# Patient Record
Sex: MALE | Race: NATIVE HAWAIIAN OR OTHER PACIFIC ISLANDER | NOT HISPANIC OR LATINO | URBAN - METROPOLITAN AREA
[De-identification: names, ages, dates, MRNs, and addresses within clinical notes are randomized per-mention and may not be internally consistent; named-entity substitution may affect disease eponyms.]

---

## 2022-08-28 ENCOUNTER — HOSPITAL ENCOUNTER (OUTPATIENT)
Dept: RADIOLOGY | Facility: MEDICAL CENTER | Age: 32
End: 2022-08-28
Attending: PHYSICIAN ASSISTANT
Payer: COMMERCIAL

## 2022-08-28 ENCOUNTER — OFFICE VISIT (OUTPATIENT)
Dept: URGENT CARE | Facility: PHYSICIAN GROUP | Age: 32
End: 2022-08-28
Payer: COMMERCIAL

## 2022-08-28 VITALS
HEART RATE: 81 BPM | HEIGHT: 72 IN | RESPIRATION RATE: 20 BRPM | SYSTOLIC BLOOD PRESSURE: 132 MMHG | TEMPERATURE: 96.4 F | DIASTOLIC BLOOD PRESSURE: 92 MMHG | WEIGHT: 315 LBS | OXYGEN SATURATION: 97 % | BODY MASS INDEX: 42.66 KG/M2

## 2022-08-28 DIAGNOSIS — M79.672 FOOT PAIN, LEFT: ICD-10-CM

## 2022-08-28 PROCEDURE — 99203 OFFICE O/P NEW LOW 30 MIN: CPT | Performed by: PHYSICIAN ASSISTANT

## 2022-08-28 PROCEDURE — 73630 X-RAY EXAM OF FOOT: CPT | Mod: LT

## 2022-08-28 ASSESSMENT — PAIN SCALES - GENERAL: PAINLEVEL: 2=MINIMAL-SLIGHT

## 2022-08-28 NOTE — PROGRESS NOTES
Subjective:   Irineo Torres is a 31 y.o. male who presents for Foot Injury (Pt states Left foot injury while working out at gym, yesterday, Saturday, 8/27/22)     Patient presents with chief complaint of left foot pain and swelling.  He states this started on Friday.  He denies any antecedent trauma but does report that he was doing some box jumps on Thursday.  He reports associated swelling.  He has tried some ice.  He has not tried any anti-inflammatories.  He does have some pain with ambulation.  He denies redness fever chills or myalgias.  No history of gout.        Medications:  This patient does not have an active medication from one of the medication groupers.    Allergies:             Patient has no known allergies.    Surgical History:       No past surgical history on file.    Past Social Hx:  Irineo Torres       Past Family Hx:   Irineo Torres family history is not on file.       Problem list, medications, and allergies reviewed by myself today in Epic.     Objective:     BP (!) 132/92 (BP Location: Left arm, Patient Position: Sitting, BP Cuff Size: Large adult)   Pulse 81   Temp (!) 35.8 °C (96.4 °F) (Temporal)   Resp 20   Ht 1.829 m (6')   Wt (!) 170 kg (375 lb 9.6 oz)   SpO2 97%   BMI 50.94 kg/m²     Physical Exam  Cardiovascular:      Pulses:           Dorsalis pedis pulses are 2+ on the right side and 2+ on the left side.        Posterior tibial pulses are 2+ on the right side and 2+ on the left side.   Musculoskeletal:        Feet:    Feet:      Left foot:      Skin integrity: No ulcer, blister, skin breakdown, erythema or warmth.      Comments: There is tenderness to palpation along the ATFL region.  No ecchymosis.  Some localized swelling to the area.  No pain to the distal tibia or fibula.  Mild tenderness to the base of the fifth metatarsal.  Neurovascularly intact.  Drawer negative.      RADIOLOGY RESULTS   DX-FOOT-COMPLETE 3+ LEFT    Result Date: 8/28/2022 8/28/2022 4:35 PM  HISTORY/REASON FOR EXAM:  Pain/Deformity Following Trauma; pain over lateral dorsum, swelling, atfl area Worsening pain to lateral aspect of left proximal foot x 2-3 days. ?Started after doing box jumps at the gym TECHNIQUE/EXAM DESCRIPTION AND NUMBER OF VIEWS: 3 views of the LEFT foot. COMPARISON:  None. FINDINGS: No acute fracture or dislocation. No joint osteoarthritis.     No acute osseous abnormality.         *X-rays were reviewed and interpreted independently by me. I agree with the radiologist's findings       Assessment/Plan:     Diagnosis and Associated Orders:     1. Foot pain, left  - DX-FOOT-COMPLETE 3+ LEFT; Future      Comments/MDM:    No radiographic evidence of bony abnormality.  Recommend ice compression elevation and rest.  Recommend ibuprofen/Tylenol as needed for pain or inflammation.  May try compression bandage.  Avoid repetitive activities.  Follow-up with orthopedics if symptoms do not improve or persist despite conservative measures    I personally reviewed prior external notes and test results pertinent to today's visit.  Red flags discussed as well as indications to present to the Emergency Department.  Supportive care, natural history, differential diagnoses, and indications for immediate follow-up discussed.  Patient expresses understanding and agrees to plan.  Patient denies any other questions or concerns.    Follow-up with the primary care physician for recheck, reevaluation, and consideration of further management.      Please note that this dictation was created using voice recognition software. I have made a reasonable attempt to correct obvious errors, but I expect that there are errors of grammar and possibly content that I did not discover before finalizing the note.    This note was electronically signed by Maya Villa PA-C

## 2025-02-24 ENCOUNTER — OFFICE VISIT (OUTPATIENT)
Dept: MEDICAL GROUP | Facility: IMAGING CENTER | Age: 35
End: 2025-02-24
Payer: COMMERCIAL

## 2025-02-24 VITALS
DIASTOLIC BLOOD PRESSURE: 82 MMHG | BODY MASS INDEX: 41.58 KG/M2 | SYSTOLIC BLOOD PRESSURE: 126 MMHG | OXYGEN SATURATION: 98 % | RESPIRATION RATE: 16 BRPM | HEIGHT: 72 IN | TEMPERATURE: 97 F | HEART RATE: 80 BPM | WEIGHT: 307 LBS

## 2025-02-24 DIAGNOSIS — Z11.3 SCREEN FOR STD (SEXUALLY TRANSMITTED DISEASE): ICD-10-CM

## 2025-02-24 DIAGNOSIS — Z23 NEED FOR VACCINATION: ICD-10-CM

## 2025-02-24 DIAGNOSIS — F33.42 RECURRENT MAJOR DEPRESSIVE DISORDER, IN FULL REMISSION (HCC): ICD-10-CM

## 2025-02-24 DIAGNOSIS — E66.01 MORBID OBESITY WITH BMI OF 40.0-44.9, ADULT (HCC): ICD-10-CM

## 2025-02-24 DIAGNOSIS — Z00.00 ANNUAL PHYSICAL EXAM: ICD-10-CM

## 2025-02-24 DIAGNOSIS — M25.572 ACUTE BILATERAL ANKLE PAIN: ICD-10-CM

## 2025-02-24 DIAGNOSIS — Z76.89 ENCOUNTER TO ESTABLISH CARE: ICD-10-CM

## 2025-02-24 DIAGNOSIS — M25.571 ACUTE BILATERAL ANKLE PAIN: ICD-10-CM

## 2025-02-24 PROBLEM — F32.5 MAJOR DEPRESSIVE DISORDER IN FULL REMISSION (HCC): Status: ACTIVE | Noted: 2025-02-24

## 2025-02-24 PROCEDURE — 90715 TDAP VACCINE 7 YRS/> IM: CPT | Performed by: NURSE PRACTITIONER

## 2025-02-24 PROCEDURE — 90471 IMMUNIZATION ADMIN: CPT | Performed by: NURSE PRACTITIONER

## 2025-02-24 PROCEDURE — 3079F DIAST BP 80-89 MM HG: CPT | Performed by: NURSE PRACTITIONER

## 2025-02-24 PROCEDURE — 99214 OFFICE O/P EST MOD 30 MIN: CPT | Mod: 25 | Performed by: NURSE PRACTITIONER

## 2025-02-24 PROCEDURE — 1126F AMNT PAIN NOTED NONE PRSNT: CPT | Performed by: NURSE PRACTITIONER

## 2025-02-24 PROCEDURE — 3074F SYST BP LT 130 MM HG: CPT | Performed by: NURSE PRACTITIONER

## 2025-02-24 RX ORDER — BUPROPION HYDROCHLORIDE 150 MG/1
150 TABLET ORAL EVERY MORNING
COMMUNITY

## 2025-02-24 ASSESSMENT — PAIN SCALES - GENERAL: PAINLEVEL_OUTOF10: NO PAIN

## 2025-02-24 ASSESSMENT — PATIENT HEALTH QUESTIONNAIRE - PHQ9: CLINICAL INTERPRETATION OF PHQ2 SCORE: 0

## 2025-02-24 NOTE — PROGRESS NOTES
Subjective:     History of Present Illness  The patient presents to [facility].    He has no history of chronic medical conditions, with the exception of childhood asthma, which has since resolved. He reports no cardiac issues during his youth. He has no history of cancer, hypertension, hypercholesterolemia, or hyperglycemia. His surgical history includes the insertion of two screws in his right tibia during his freshman year of high school. He has not undergone any recent blood tests, with the last one conducted over a year ago as part of a weight loss initiative. He has not been screened for HIV or hepatitis C. He is sexually active. He has a history of chickenpox. He is uncertain about his tetanus vaccination status. He uses nicotine patches and occasionally vapes but does not smoke or use illicit drugs.    He has been experiencing headaches, which he attributes to his medication, tirzepatide. He has been on tirzepatide since 11/2024, prescribed through an online health service. He has lost approximately 30 pounds since starting the medication. He is currently on a 10 mg dose, which he will continue for the next 2 months.    He is currently on bupropion extended release 150 mg for depression, prescribed through [employer], an online health service. He was previously seeing a therapist until the end of 2024 but has not consulted a psychiatrist. He has been gradually reducing his bupropion dosage and is considering discontinuing it altogether.    He has been experiencing foot pain, initially in the left foot in 06/2024, which rendered him unable to walk. Despite x-rays showing no abnormalities, he was diagnosed with plantar fasciitis. The pain, which was localized to the top of the foot, persisted for a month before resolving spontaneously. A similar episode occurred in the right foot last month, with the pain radiating from the top of the foot to the ankle. This episode lasted for 3 to 4 weeks and was less severe  than the previous one, as he was able to walk. Both episodes were accompanied by swelling and redness. He has been managing the pain with ibuprofen, which provides some relief. He suspects the cause may be gout, although he does not consume meat regularly. He is not currently experiencing any foot symptoms.    SOCIAL HISTORY  He uses nicotine patches and occasionally vapes but does not smoke or use illicit drugs.    FAMILY HISTORY  His mother has low blood pressure and depression. His great-grandmother had colon cancer. His maternal grandfather passed away from cancer, possibly lung cancer. He reports no family history of high blood pressure, high cholesterol, diabetes, thyroid problems, prostate cancer, or arthritis. His father is alive and has no medical problems. He has 4 sisters who do not have any medical problems.    MEDICATIONS  Current: Bupropion, tirzepatide, ibuprofen.    IMMUNIZATIONS  He is uncertain about his tetanus vaccination status.      Current medicines (including changes today)  Current Outpatient Medications   Medication Sig Dispense Refill    TIRZEPATIDE-WEIGHT MANAGEMENT SC Inject  under the skin.      buPROPion (WELLBUTRIN XL) 150 MG XL tablet Take 150 mg by mouth every morning.       No current facility-administered medications for this visit.     He  has a past medical history of Asthma and Depression.    ROS   No chest pain, no shortness of breath, no abdominal pain  Positive ROS as per HPI.  All other systems reviewed and are negative.     Objective:     /82 (BP Location: Right arm, Patient Position: Sitting, BP Cuff Size: Large adult)   Pulse 80   Temp 36.1 °C (97 °F) (Temporal)   Resp 16   Ht 1.829 m (6')   Wt (!) 139 kg (307 lb)   SpO2 98%  Body mass index is 41.64 kg/m².   Physical Exam    Constitutional: Alert, no distress.  Skin: Warm, dry, good turgor, no rashes in visible areas.  Eye: Equal, round and reactive, conjunctiva clear, lids normal.  ENMT: Lips without  lesions, good dentition  Respiratory: Unlabored respiratory effort  Psych: Alert and oriented x3, normal affect and mood.      Results          Assessment and Plan:   The following treatment plan was discussed    Assessment & Plan  1. Establishment of care.  Comprehensive laboratory workup will be conducted to establish baseline health status. Tetanus vaccine will be administered today. He is advised to inquire about his mother's colonoscopy results and whether she had to undergo the procedure at an earlier age due to her cyst removal. He is scheduled for a colonoscopy in 10 years.    2. Depression.  He is advised to discontinue Wellbutrin and monitor his symptoms. If he experiences worsening depression, he can resume the medication and inform the provider.    3. Foot pain.  Uric acid level will be checked to evaluate for possible gout. If he experiences another episode of foot pain, he is advised to return for further evaluation, including potential arthritis testing.    4. Weight management.  Continue tirzepatide we telemedicine     Follow-up  The patient will follow up in 2 months.        ORDERS:  1. Encounter to establish care      2. Recurrent major depressive disorder, in full remission (HCC)      3. Morbid obesity with BMI of 40.0-44.9, adult (HCC)    - Patient identified as having weight management issue.  Appropriate orders and counseling given.  - HEMOGLOBIN A1C; Future    4. Annual physical exam    - CBC WITH DIFFERENTIAL; Future  - Comp Metabolic Panel; Future  - TSH WITH REFLEX TO FT4; Future  - Lipid Profile; Future  - HEMOGLOBIN A1C; Future  - URIC ACID; Future    5. Acute bilateral ankle pain    - URIC ACID; Future    6. Screen for STD (sexually transmitted disease)    - HIV AG/AB COMBO ASSAY SCREENING; Future  - HEP C VIRUS ANTIBODY; Future  - RPR (SYPHILIS); Future    7. Need for vaccination    - Tdap Vaccine =>8YO IM          The MA is performing the above orders under the direction of   Saumya    Please note that this dictation was created using voice recognition software. I have made every reasonable attempt to correct obvious errors, but I expect that there are errors of grammar and possibly content that I did not discover before finalizing the note.      Attestation      Verbal consent was acquired by the patient to use [employer] ambient listening note generation during this visit Yes

## 2025-03-29 ENCOUNTER — HOSPITAL ENCOUNTER (OUTPATIENT)
Facility: MEDICAL CENTER | Age: 35
End: 2025-03-29
Attending: NURSE PRACTITIONER
Payer: COMMERCIAL

## 2025-03-29 DIAGNOSIS — E66.01 MORBID OBESITY WITH BMI OF 40.0-44.9, ADULT (HCC): ICD-10-CM

## 2025-03-29 DIAGNOSIS — M25.572 ACUTE BILATERAL ANKLE PAIN: ICD-10-CM

## 2025-03-29 DIAGNOSIS — Z11.3 SCREEN FOR STD (SEXUALLY TRANSMITTED DISEASE): ICD-10-CM

## 2025-03-29 DIAGNOSIS — M25.571 ACUTE BILATERAL ANKLE PAIN: ICD-10-CM

## 2025-03-29 DIAGNOSIS — Z00.00 ANNUAL PHYSICAL EXAM: ICD-10-CM

## 2025-03-29 LAB
ALBUMIN SERPL BCP-MCNC: 4.3 G/DL (ref 3.2–4.9)
ALBUMIN/GLOB SERPL: 1.2 G/DL
ALP SERPL-CCNC: 57 U/L (ref 30–99)
ALT SERPL-CCNC: 17 U/L (ref 2–50)
ANION GAP SERPL CALC-SCNC: 11 MMOL/L (ref 7–16)
AST SERPL-CCNC: 20 U/L (ref 12–45)
BASOPHILS # BLD AUTO: 0.6 % (ref 0–1.8)
BASOPHILS # BLD: 0.04 K/UL (ref 0–0.12)
BILIRUB SERPL-MCNC: 0.4 MG/DL (ref 0.1–1.5)
BUN SERPL-MCNC: 12 MG/DL (ref 8–22)
CALCIUM ALBUM COR SERPL-MCNC: 9.2 MG/DL (ref 8.5–10.5)
CALCIUM SERPL-MCNC: 9.4 MG/DL (ref 8.5–10.5)
CHLORIDE SERPL-SCNC: 103 MMOL/L (ref 96–112)
CHOLEST SERPL-MCNC: 185 MG/DL (ref 100–199)
CO2 SERPL-SCNC: 25 MMOL/L (ref 20–33)
CREAT SERPL-MCNC: 0.96 MG/DL (ref 0.5–1.4)
EOSINOPHIL # BLD AUTO: 0.16 K/UL (ref 0–0.51)
EOSINOPHIL NFR BLD: 2.2 % (ref 0–6.9)
ERYTHROCYTE [DISTWIDTH] IN BLOOD BY AUTOMATED COUNT: 43.2 FL (ref 35.9–50)
EST. AVERAGE GLUCOSE BLD GHB EST-MCNC: 97 MG/DL
FASTING STATUS PATIENT QL REPORTED: NORMAL
GFR SERPLBLD CREATININE-BSD FMLA CKD-EPI: 105 ML/MIN/1.73 M 2
GLOBULIN SER CALC-MCNC: 3.5 G/DL (ref 1.9–3.5)
GLUCOSE SERPL-MCNC: 81 MG/DL (ref 65–99)
HBA1C MFR BLD: 5 % (ref 4–5.6)
HCT VFR BLD AUTO: 47.3 % (ref 42–52)
HCV AB SER QL: NORMAL
HDLC SERPL-MCNC: 37 MG/DL
HGB BLD-MCNC: 15 G/DL (ref 14–18)
HIV 1+2 AB+HIV1 P24 AG SERPL QL IA: NORMAL
IMM GRANULOCYTES # BLD AUTO: 0.02 K/UL (ref 0–0.11)
IMM GRANULOCYTES NFR BLD AUTO: 0.3 % (ref 0–0.9)
LDLC SERPL CALC-MCNC: 132 MG/DL
LYMPHOCYTES # BLD AUTO: 1.92 K/UL (ref 1–4.8)
LYMPHOCYTES NFR BLD: 26.7 % (ref 22–41)
MCH RBC QN AUTO: 28.5 PG (ref 27–33)
MCHC RBC AUTO-ENTMCNC: 31.7 G/DL (ref 32.3–36.5)
MCV RBC AUTO: 89.9 FL (ref 81.4–97.8)
MONOCYTES # BLD AUTO: 0.57 K/UL (ref 0–0.85)
MONOCYTES NFR BLD AUTO: 7.9 % (ref 0–13.4)
NEUTROPHILS # BLD AUTO: 4.48 K/UL (ref 1.82–7.42)
NEUTROPHILS NFR BLD: 62.3 % (ref 44–72)
NRBC # BLD AUTO: 0 K/UL
NRBC BLD-RTO: 0 /100 WBC (ref 0–0.2)
PLATELET # BLD AUTO: 287 K/UL (ref 164–446)
PMV BLD AUTO: 10.7 FL (ref 9–12.9)
POTASSIUM SERPL-SCNC: 3.9 MMOL/L (ref 3.6–5.5)
PROT SERPL-MCNC: 7.8 G/DL (ref 6–8.2)
RBC # BLD AUTO: 5.26 M/UL (ref 4.7–6.1)
SODIUM SERPL-SCNC: 139 MMOL/L (ref 135–145)
T PALLIDUM AB SER QL IA: NORMAL
TRIGL SERPL-MCNC: 81 MG/DL (ref 0–149)
TSH SERPL DL<=0.005 MIU/L-ACNC: 3.93 UIU/ML (ref 0.38–5.33)
URATE SERPL-MCNC: 7.9 MG/DL (ref 2.5–8.3)
WBC # BLD AUTO: 7.2 K/UL (ref 4.8–10.8)

## 2025-03-29 PROCEDURE — 83036 HEMOGLOBIN GLYCOSYLATED A1C: CPT

## 2025-03-29 PROCEDURE — 87389 HIV-1 AG W/HIV-1&-2 AB AG IA: CPT

## 2025-03-29 PROCEDURE — 80061 LIPID PANEL: CPT

## 2025-03-29 PROCEDURE — 84550 ASSAY OF BLOOD/URIC ACID: CPT

## 2025-03-29 PROCEDURE — 86803 HEPATITIS C AB TEST: CPT

## 2025-03-29 PROCEDURE — 80053 COMPREHEN METABOLIC PANEL: CPT

## 2025-03-29 PROCEDURE — 86780 TREPONEMA PALLIDUM: CPT

## 2025-03-29 PROCEDURE — 36415 COLL VENOUS BLD VENIPUNCTURE: CPT

## 2025-03-29 PROCEDURE — 84443 ASSAY THYROID STIM HORMONE: CPT

## 2025-03-29 PROCEDURE — 85025 COMPLETE CBC W/AUTO DIFF WBC: CPT

## 2025-04-01 ENCOUNTER — RESULTS FOLLOW-UP (OUTPATIENT)
Dept: MEDICAL GROUP | Facility: IMAGING CENTER | Age: 35
End: 2025-04-01

## 2025-05-30 ENCOUNTER — HOSPITAL ENCOUNTER (OUTPATIENT)
Facility: MEDICAL CENTER | Age: 35
End: 2025-05-30
Attending: PODIATRIST
Payer: COMMERCIAL

## 2025-05-30 LAB — URATE SERPL-MCNC: 7.3 MG/DL (ref 2.5–8.3)

## 2025-05-30 PROCEDURE — 84550 ASSAY OF BLOOD/URIC ACID: CPT

## 2025-05-30 PROCEDURE — 36415 COLL VENOUS BLD VENIPUNCTURE: CPT

## 2025-11-04 ENCOUNTER — APPOINTMENT (OUTPATIENT)
Dept: MEDICAL GROUP | Facility: IMAGING CENTER | Age: 35
End: 2025-11-04
Payer: COMMERCIAL